# Patient Record
Sex: MALE
[De-identification: names, ages, dates, MRNs, and addresses within clinical notes are randomized per-mention and may not be internally consistent; named-entity substitution may affect disease eponyms.]

---

## 2021-05-28 ENCOUNTER — HOSPITAL ENCOUNTER (EMERGENCY)
Dept: HOSPITAL 47 - EC | Age: 26
Discharge: HOME | End: 2021-05-28
Payer: COMMERCIAL

## 2021-05-28 VITALS
DIASTOLIC BLOOD PRESSURE: 70 MMHG | RESPIRATION RATE: 16 BRPM | HEART RATE: 78 BPM | SYSTOLIC BLOOD PRESSURE: 128 MMHG | TEMPERATURE: 98.5 F

## 2021-05-28 DIAGNOSIS — Z20.822: Primary | ICD-10-CM

## 2021-05-28 PROCEDURE — 87635 SARS-COV-2 COVID-19 AMP PRB: CPT

## 2021-05-28 PROCEDURE — 99283 EMERGENCY DEPT VISIT LOW MDM: CPT

## 2021-05-28 NOTE — ED
General Adult HPI





- General


Chief complaint: Recheck/Abnormal Lab/Rx


Stated complaint: Needs covid test.


Time Seen by Provider: 05/28/21 16:33


Source: patient, RN notes reviewed


Mode of arrival: ambulatory


Limitations: no limitations





- History of Present Illness


Initial comments: 





26-year-old male presents to the emergency room requiring Covid tests.  States 

that his roommate tested positive today.  Symptoms for his roommate just started

today.  Patient states he needs a Covid test to return back to his clinicals.  

Patient denies any symptoms whatsoever.  Denies cough congestion sore throat 

soreness of breath or chest pain.  He denies fevers.  States that he is now 

going to be staying at his friend's house instead.Patient has no other 

complaints at this time including shortness of breath, chest pain, abdominal 

pain, nausea or vomiting, headache, or visual changes.





- Related Data


                                    Allergies











Allergy/AdvReac Type Severity Reaction Status Date / Time


 


No Known Allergies Allergy   Verified 05/28/21 16:20














Review of Systems


ROS Statement: 


Those systems with pertinent positive or pertinent negative responses have been 

documented in the HPI.





ROS Other: All systems not noted in ROS Statement are negative.





Past Medical History


Past Medical History: No Reported History


History of Any Multi-Drug Resistant Organisms: None Reported


Past Surgical History: Orthopedic Surgery


Past Psychological History: No Psychological Hx Reported


Smoking Status: Never smoker


Past Alcohol Use History: None Reported


Past Drug Use History: None Reported





General Exam


Limitations: no limitations


General appearance: alert, in no apparent distress


Head exam: Present: atraumatic


Eye exam: Present: normal appearance.  Absent: scleral icterus, conjunctival 

injection, periorbital swelling


ENT exam: Present: normal exam, mucous membranes moist


Neck exam: Present: normal inspection, full ROM


Respiratory exam: Absent: respiratory distress


Neurological exam: Present: alert, oriented X3, normal gait





Course





                                   Vital Signs











  05/28/21





  16:18


 


Temperature 98.1 F


 


Pulse Rate 90


 


Respiratory 18





Rate 


 


Blood Pressure 173/86


 


O2 Sat by Pulse 100





Oximetry 














Medical Decision Making





- Medical Decision Making





Patient's COVID test is negative. Again, He has no symptoms.  Recommend that at 

this point he have a repeat test in the next few days.  Recommend that he 

discussed this with his clinical coordinator or hospital as he was recently 

exposed and they may want to keep him out of clinicals.  Recommend he return to 

the emergency room for any worsening symptoms.





- Lab Data





                                   Lab Results











  05/28/21 Range/Units





  16:39 


 


Coronavirus (PCR)  Not Detected  (Not Detectd)  














Disposition


Clinical Impression: 


 Lab test negative for COVID-19 virus





Disposition: HOME SELF-CARE


Condition: Good


Instructions (If sedation given, give patient instructions):  Coronavirus 

Disease 2019 (COVID-19)


Additional Instructions: 


Please follow-up with your doctor in one to 2 days.  Return to the emergency 

room for any worsening symptoms.


Is patient prescribed a controlled substance at d/c from ED?: No


Referrals: 


Dru Durán MD [REFERRING] - 1-2 days


Time of Disposition: 17:26